# Patient Record
Sex: FEMALE | Race: BLACK OR AFRICAN AMERICAN | Employment: PART TIME | URBAN - METROPOLITAN AREA
[De-identification: names, ages, dates, MRNs, and addresses within clinical notes are randomized per-mention and may not be internally consistent; named-entity substitution may affect disease eponyms.]

---
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
## 2020-07-18 ENCOUNTER — OFFICE VISIT (OUTPATIENT)
Dept: FAMILY MEDICINE CLINIC | Age: 20
End: 2020-07-18
Payer: COMMERCIAL

## 2020-07-18 VITALS
SYSTOLIC BLOOD PRESSURE: 118 MMHG | RESPIRATION RATE: 15 BRPM | HEART RATE: 84 BPM | BODY MASS INDEX: 18.94 KG/M2 | TEMPERATURE: 98 F | DIASTOLIC BLOOD PRESSURE: 84 MMHG | HEIGHT: 68 IN | OXYGEN SATURATION: 98 % | WEIGHT: 125 LBS

## 2020-07-18 PROBLEM — Z34.90 PREGNANT: Status: ACTIVE | Noted: 2020-07-18

## 2020-07-18 PROCEDURE — G8427 DOCREV CUR MEDS BY ELIG CLIN: HCPCS | Performed by: NURSE PRACTITIONER

## 2020-07-18 PROCEDURE — G8420 CALC BMI NORM PARAMETERS: HCPCS | Performed by: NURSE PRACTITIONER

## 2020-07-18 PROCEDURE — 1036F TOBACCO NON-USER: CPT | Performed by: NURSE PRACTITIONER

## 2020-07-18 PROCEDURE — 99212 OFFICE O/P EST SF 10 MIN: CPT | Performed by: NURSE PRACTITIONER

## 2020-07-18 RX ORDER — VITAMIN A ACETATE, .BETA.-CAROTENE, ASCORBIC ACID, CHOLECALCIFEROL, .ALPHA.-TOCOPHEROL ACETATE, DL-, THIAMINE MONONITRATE, RIBOFLAVIN, NIACINAMIDE, PYRIDOXINE HYDROCHLORIDE, FOLIC ACID, CYANOCOBALAMIN, CALCIUM CARBONATE, FERROUS FUMARATE, ZINC OXIDE, AND CUPRIC OXIDE 2000; 2000; 120; 400; 22; 1.84; 3; 20; 10; 1; 12; 200; 27; 25; 2 [IU]/1; [IU]/1; MG/1; [IU]/1; MG/1; MG/1; MG/1; MG/1; MG/1; MG/1; UG/1; MG/1; MG/1; MG/1; MG/1
TABLET ORAL
COMMUNITY

## 2020-07-18 NOTE — LETTER
[address] [address]  Phone: [phone]  Fax: [phone]      July 18, 2020       To Whom it May Concern:    Anthony Haskins was evaluated in our [facility] today, 7/18/2020. Ms. Beatrice Card  should remain out of work until herCOVID-19 test results are back. If you have any questions on concerns, please do not hesitate to call.           Sincerely,          Electronically signed by TANA Jones CNP on 7/18/2020 at 2:37 PM

## 2020-07-18 NOTE — PROGRESS NOTES
[address] [address] [address]  Phone: [phone]    SUBJECTIVE:     Ramona Barba is a 23 y.o. female who presents on 7/18/2020 with:    HPI:  Chief Complaint   Patient presents with    Covid Testing     needs covid testing. pt states she has been around someone who tested postive for covid, denies having any symptoms. Exposure to [address] was self-referred w/ complaint of: close exposure to COVID-19. Pt was just advised her sister's test for covid-19 on 7/13 came back positive. Sister was exposed by her paternal grandmother. Pt reports being in close proximity to her sister, indoors, on multiple occasions within the last 7 days. Sister's sx's included myalgias x 1 day w/o fever or cough and are now resolved. Pt reports she is in her usual state of health. Reports nasal congestion w/ occasional  throat clearing is at baseline    Pt denies: fever/ chills, cough, dyspnea, wheezing, chest tightness, HA, sore throat, N/V/D. Denies anosmia/ dysgeusia. Pt is currently pregnant-- will be 19 weeks on 7/20. Lives w/ her boyfriend. Relevant PMH: no pertinent PMH. History reviewed. No pertinent past medical history. No family history on file. Smoking history: pt reports that she has never smoked. She has never used smokeless tobacco.     Travel screen completed: yes- no recent travel. Pt states was recently hired at [employer] supposed to be meeting w/ HR next week to discuss start date. States she wants to be tested given exposure by her sister prior to starting the new job. Review of Systems   Constitutional: Negative for chills and fever. HENT: Positive for congestion (baseline). Negative for ear pain, rhinorrhea and sore throat. Respiratory: Negative for cough, chest tightness, shortness of breath and wheezing. Cardiovascular: Negative for chest pain and palpitations.    Gastrointestinal: Negative for abdominal pain, diarrhea, nausea and vomiting. Genitourinary: Negative for dysuria and vaginal bleeding. Musculoskeletal: Negative for myalgias. Skin: Negative for rash. Neurological: Negative for weakness, light-headedness and headaches. OBJECTIVE:     Vitals:    07/18/20 1403   BP: 118/84   Pulse: 84   Resp: 15   Temp: 98 °F (36.7 °C)   TempSrc: Oral   SpO2: 98%   Weight: 125 lb (56.7 kg)   Height: 5' 8\" (1.727 m)      Physical Exam  Vitals signs reviewed. Constitutional:       Appearance: She is well-groomed. She is not toxic-appearing. HENT:      Head: Normocephalic and atraumatic. Right Ear: External ear normal.      Left Ear: External ear normal.      Nose: Nose normal.      Mouth/Throat:      Mouth: Mucous membranes are moist.   Eyes:      Extraocular Movements: Extraocular movements intact. Conjunctiva/sclera: Conjunctivae normal.      Pupils: Pupils are equal, round, and reactive to light. Neck:      Musculoskeletal: Normal range of motion and neck supple. Trachea: Trachea and phonation normal.   Cardiovascular:      Rate and Rhythm: Normal rate and regular rhythm. Pulses: Normal pulses. Heart sounds: S1 normal and S2 normal.   Pulmonary:      Effort: Pulmonary effort is normal. No tachypnea. Breath sounds: Normal breath sounds and air entry. Musculoskeletal: Normal range of motion. Skin:     General: Skin is warm and dry. Capillary Refill: Capillary refill takes less than 2 seconds. Findings: No rash. Neurological:      General: No focal deficit present. Mental Status: She is alert and oriented to person, place, and time. Mental status is at baseline. Motor: Motor function is intact. Gait: Gait is intact. Psychiatric:         Mood and Affect: Mood and affect normal.         Speech: Speech normal.         Behavior: Behavior normal. Behavior is cooperative. Thought Content:  Thought content normal.     TESTS:  [x] COVID-19 specimen was collected via oropharyngeal route by CLEMENTINA Hewitt MA and sent to lab. ASSESSMENT & PLAN:     23 y.o. female presenting for COVID testing after close exposure. COVID-19 test was submitted. Pt was instructed about strict self-isolation at home pending results w/ self-monitoring for signs/ sx's of illness and prompt follow-up should any develop. Diagnoses and all orders for this visit:      ICD-10-CM    1. Close exposure to COVID-19 virus  Z20.828 COVID-19 Ambulatory       Pt was sent home w/ written instructions for:  Possible COVID-19 infection w/ self-quarantine at home and management of symptoms  Avoid close contact w/ household members where possible  Do not leave home except to get medical care while waiting for results  Frequent hand washing, cover coughs/ sneezes, disinfect high-touch surfaces daily  Maintain adequate rest and hydration  Vigilant self-monitoring for any signs/ sx's of illness/ fever and prompt follow-up w/ PCP should any develop    Pt is in agreement w/ this care plan. All questions answered.       Kat Ugarte, APRN - CNP  7/18/20

## 2020-07-18 NOTE — PATIENT INSTRUCTIONS
We'll call with COVID-19 results    Things to Know:   - Do not leave home except to get medical care while waiting for your test results  - Testing does not change treatment--> there is no medication that treats COVID-19  - Get plenty of rest and stay hydrated   - Over the counter pain/ fever reducers such as ibuprofen (aka Motrin/ Advil) or acetaminophen (aka Tylenol) per dosage instructions as needed **please do not take these medications if you have a bleeding disorder, stomach or GI ulcer problems, or liver disease**   - Avoid exposure to environmental irritants --> cigarette smoke/pollutants/seasonal allergens where possible  - Practice social distancing (maintaining a distance of 6 feet from other persons) when leaving home is necessary.  - Temperature checks twice daily     Please be vigilant in self-monitoring for signs or symptoms of illness or fever. Symptoms may develop 2 days to 2 weeks following exposure to the virus. If you are exposed after testing, or if you were in the incubation period when tested, you could become ill with COVID-19 later. Keep a low threshold to go to ER or call 9-1-1- for new or suddenly worsening symptoms --> change in nature of cough, high fevers, trouble breathing, persistent vomiting, any other emergency warning signs, or symptoms that you think could be an emergency. Advance Care Planning  People with COVID-19 may have no symptoms, mild symptoms, such as fever, cough, and shortness of breath or they may have more severe illness, developing severe and fatal pneumonia. As a result, Advance Care Planning with attention to naming a health care decision maker (someone you trust to make healthcare decisions for you if you could not speak for yourself) and sharing other health care preferences is important BEFORE a possible health crisis. Please contact your Primary Care Provider to discuss Advance Care Planning.     Prevention steps for People with confirmed or doctor  If you have a medical appointment, call the healthcare provider and tell them that you have or may have COVID-19. This will help the healthcare providers office take steps to keep other people from getting infected or exposed. Wear a facemask  You should wear a facemask when you are around other people (e.g., sharing a room or vehicle) or pets and before you enter a healthcare providers office. If you are not able to wear a facemask (for example, because it causes trouble breathing), then people who live with you should not stay in the same room with you, or they should wear a facemask if they enter your room. Cover your coughs and sneezes  Cover your mouth and nose with a tissue when you cough or sneeze. Throw used tissues in a lined trash can. Immediately wash your hands with soap and water for at least 20 seconds or, if soap and water are not available, clean your hands with an alcohol-based hand  that contains at least 60% alcohol. Clean your hands often  Wash your hands often with soap and water for at least 20 seconds, especially after blowing your nose, coughing, or sneezing; going to the bathroom; and before eating or preparing food. If soap and water are not readily available, use an alcohol-based hand  with at least 60% alcohol, covering all surfaces of your hands and rubbing them together until they feel dry. Soap and water are the best option if hands are visibly dirty. Avoid touching your eyes, nose, and mouth with unwashed hands. Avoid sharing personal household items  You should not share dishes, drinking glasses, cups, eating utensils, towels, or bedding with other people or pets in your home. After using these items, they should be washed thoroughly with soap and water. Clean all high-touch surfaces everyday  High touch surfaces include counters, tabletops, doorknobs, bathroom fixtures, toilets, phones, keyboards, tablets, and bedside tables.  Also, clean any surfaces that may have blood, stool, or body fluids on them. Use a household cleaning spray or wipe, according to the label instructions. Labels contain instructions for safe and effective use of the cleaning product including precautions you should take when applying the product, such as wearing gloves and making sure you have good ventilation during use of the product. Monitor your symptoms  Seek prompt medical attention if your illness is worsening (e.g., difficulty breathing). Before seeking care, call your healthcare provider and tell them that you have, or are being evaluated for, COVID-19. Put on a facemask before you enter the facility. These steps will help the healthcare providers office to keep other people in the office or waiting room from getting infected or exposed. Ask your healthcare provider to call the local or state health department. Persons who are placed under active monitoring or facilitated self-monitoring should follow instructions provided by their local health department or occupational health professionals, as appropriate. When working with your local health department check their available hours. If you have a medical emergency and need to call 911, notify the dispatch personnel that you have, or are being evaluated for COVID-19. If possible, put on a facemask before emergency medical services arrive. Discontinuing home isolation  Patients with confirmed COVID-19 should remain under home isolation precautions until the risk of secondary transmission to others is thought to be low. The decision to discontinue home isolation precautions should be made on a case-by-case basis, in consultation with healthcare providers and state and local health departments.

## 2020-07-19 DIAGNOSIS — Z20.822 CLOSE EXPOSURE TO COVID-19 VIRUS: ICD-10-CM

## 2020-07-24 LAB
SARS-COV-2: NOT DETECTED
SOURCE: NORMAL

## 2020-07-30 ASSESSMENT — ENCOUNTER SYMPTOMS
DIARRHEA: 0
VOMITING: 0
SHORTNESS OF BREATH: 0
WHEEZING: 0
RHINORRHEA: 0
ABDOMINAL PAIN: 0
COUGH: 0
CHEST TIGHTNESS: 0
NAUSEA: 0
SORE THROAT: 0

## 2021-05-29 ENCOUNTER — HOSPITAL ENCOUNTER (EMERGENCY)
Age: 21
Discharge: HOME | End: 2021-05-29
Attending: EMERGENCY MEDICINE
Payer: COMMERCIAL

## 2021-05-29 ENCOUNTER — APPOINTMENT (OUTPATIENT)
Dept: GENERAL RADIOLOGY | Age: 21
End: 2021-05-29
Payer: COMMERCIAL

## 2021-05-29 VITALS
SYSTOLIC BLOOD PRESSURE: 109 MMHG | BODY MASS INDEX: 19.62 KG/M2 | TEMPERATURE: 97.6 F | RESPIRATION RATE: 18 BRPM | HEIGHT: 67 IN | WEIGHT: 125 LBS | DIASTOLIC BLOOD PRESSURE: 99 MMHG | OXYGEN SATURATION: 97 % | HEART RATE: 63 BPM

## 2021-05-29 DIAGNOSIS — K21.9 GASTROESOPHAGEAL REFLUX DISEASE, UNSPECIFIED WHETHER ESOPHAGITIS PRESENT: Primary | ICD-10-CM

## 2021-05-29 PROCEDURE — 93005 ELECTROCARDIOGRAM TRACING: CPT

## 2021-05-29 PROCEDURE — 71046 X-RAY EXAM CHEST 2 VIEWS: CPT

## 2021-05-29 PROCEDURE — 99283 EMERGENCY DEPT VISIT LOW MDM: CPT

## 2021-05-29 ASSESSMENT — ENCOUNTER SYMPTOMS
EYE PAIN: 0
ABDOMINAL PAIN: 1
COLOR CHANGE: 0
NAUSEA: 0
SINUS PAIN: 0
COUGH: 0
SHORTNESS OF BREATH: 1
SORE THROAT: 0
EYE REDNESS: 0
BACK PAIN: 0
VOMITING: 0
DIARRHEA: 0

## 2021-05-29 ASSESSMENT — PAIN SCALES - GENERAL: PAINLEVEL_OUTOF10: 7

## 2021-05-29 ASSESSMENT — PAIN DESCRIPTION - LOCATION: LOCATION: CHEST

## 2021-05-29 ASSESSMENT — PAIN DESCRIPTION - PAIN TYPE: TYPE: ACUTE PAIN

## 2021-05-29 NOTE — ED PROVIDER NOTES
[address] [facility] ED  EMERGENCY DEPARTMENT ENCOUNTER      Pt Name: Anastasia Crawford  MRN: [id number]  [place] 2000  Date of evaluation: 5/29/2021  Provider: Gearline Osgood, DO    CHIEF COMPLAINT       Chief Complaint   Patient presents with    Chest Pain     for several days, shows as epigastric pain, worse with lying down     Chief complaint: Epigastric and chest pain  History of chief complaint: This 66-year-old female presents to the emergency department complaining of awakening early hours of this morning with epigastric burning pain that radiated up into her mid low chest area, patient states reports some associated shortness of breath, the burning pain takes her breath. Patient denies any change in diet. Patient does report multiple previous episodes several months ago states her family doctor did refer her to the gastroenterologist who she saw and was diagnosed with \"indigestion\" patient states she was given a 1 week course of pills at that point and did seem to resolve but started recurring this past week. Patient denies previous EGD. patient states it always seems to occur while she is lying down and will get better after she gets up. Patient denies any sore throat cough cold congestion no palpitations no fevers or chills. Patient denies any regular alcohol use. No associated nausea vomiting or diarrhea. No dark or bloody stool. No difficulty urinating. Patient is postpartum 6 months breast-feeding. Patient denies pregnancy states she has an IUD. She denies any leg pain or swelling, no history of DVT or PE. Nursing Notes were reviewed. REVIEW OF SYSTEMS    (2-9 systems for level 4, 10 or more for level 5)     Review of Systems   Constitutional: Negative for appetite change, chills and fever. HENT: Negative for congestion, sinus pain and sore throat. Eyes: Negative for pain and redness. Respiratory: Positive for shortness of breath. Negative for cough.     Cardiovascular: Exercise per Session:    Stress:     Feeling of Stress :    Social Connections:     Frequency of Communication with Friends and Family:     Frequency of Social Gatherings with Friends and Family:     Attends Taoist Services:     Active Member of Clubs or Organizations:     Attends Club or Organization Meetings:     Marital Status:    Intimate Partner Violence:     Fear of Current or Ex-Partner:     Emotionally Abused:     Physically Abused:     Sexually Abused:          PHYSICAL EXAM    (up to 7 for level 4, 8 or more for level 5)     ED Triage Vitals [05/29/21 0639]   BP Temp Temp Source Pulse Resp SpO2 Height Weight   (!) 109/99 97.6 °F (36.4 °C) Oral 63 18 97 % 5' 7\" (1.702 m) 125 lb (56.7 kg)       Physical Exam   General appearance: Patient is awake alert interactive appropriate nontoxic in no acute distress  Head is atraumatic normocephalic  Eyes pupils are equal and reactive sclera white conjunctive are pink  Oral pharyngeal cavity is pink with good moisture, no exudates or ulcerations no asymmetry, the airway is widely patent  Neck: Supple no meningeal signs no adenopathy no JVD  Heart: Regular rate and rhythm no gross murmurs rubs or clicks  Lungs: Breath sounds are clear with good air movement throughout no active wheezes rales or rhonchi no respiratory distress  Chest wall: Nontender to palpation chest rise is full and symmetric  Abdomen: Soft nontender to palpation, there are good bowel sounds no rebound rigidity or guarding, no Gustafson sign or McBurney's point tenderness, no firm or pulsatile masses, no gross distention, femoral pulses full and symmetric  Back: Nontender to palpation no costovertebral angle tenderness  Skin: Warm and dry without rashes  Lower extremities: No edema or calf tenderness or asymmetry.     DIAGNOSTIC RESULTS     EKG: All EKG's are interpreted by the Emergency Department Physician who either signs or Co-signs this chart in the absence of a cardiologist.    EKG interpreted by ED physician indication epigastric pain: Normal sinus rhythm at 61 with incomplete right bundle branch block, no significant ST-T change no acute infarction pattern    RADIOLOGY:   Non-plain film images such as CT, Ultrasound and MRI are read by the radiologist. Plain radiographic images are visualized and preliminarily interpreted by the emergency physician with the below findings:    Chest x-ray 2 views interpreted by ED physician indication epigastric/chest pain: Heart and mediastinum are within normal limits the lung fields are clear there are no acute consolidations vascular congestion or pneumothorax appreciated. Official radiology report is pending    Interpretation per the Radiologist below, if available at the time of this note:    XR CHEST (2 VW)    (Results Pending)         EMERGENCY DEPARTMENT COURSE and DIFFERENTIAL DIAGNOSIS/MDM:   Vitals:    Vitals:    05/29/21 0639   BP: (!) 109/99   Pulse: 63   Resp: 18   Temp: 97.6 °F (36.4 °C)   TempSrc: Oral   SpO2: 97%   Weight: 125 lb (56.7 kg)   Height: 5' 7\" (1.702 m)       Treatment and course: Patient reports a burning resolved on my examination abdomen is benign. I did discuss with patient EKG and x-ray findings with no significant pathology, suspect gastroesophageal reflux. I did discuss with patient Maalox or Tums generally thought safe with breast-feeding as needed. I did discuss further evaluation with the GI if symptoms continue for possible EGD. FINAL IMPRESSION      1. Gastroesophageal reflux disease, unspecified whether esophagitis present          DISPOSITION/PLAN   DISPOSITION Decision To Discharge 05/29/2021 07:55:20 AM  Patient discharged home advised Tums or Maalox as needed. Monitor closely and return if any change or worsening increasing/recurrent pain or difficulty breathing, any fever, dark or bloody stools, weak or dizzy feeling.   Patient to follow-up with primary physician for repeat assessment in the next 2 to 3 days    PATIENT REFERRED TO:  [facility]  [address] [phone]  In 2 days        DISCHARGE MEDICATIONS:  New Prescriptions    No medications on file     Controlled Substances Monitoring:     No flowsheet data found.     (Please note that portions of this note were completed with a voice recognition program.  Efforts were made to edit the dictations but occasionally words are mis-transcribed.)    Steven Amaya DO (electronically signed)  Attending Emergency Physician            Steven Amaya DO  05/29/21 [id number]

## 2021-05-31 LAB
EKG ATRIAL RATE: 61 BPM
EKG P AXIS: 68 DEGREES
EKG P-R INTERVAL: 158 MS
EKG Q-T INTERVAL: 412 MS
EKG QRS DURATION: 104 MS
EKG QTC CALCULATION (BAZETT): 414 MS
EKG R AXIS: 76 DEGREES
EKG T AXIS: 67 DEGREES
EKG VENTRICULAR RATE: 61 BPM

## 2021-05-31 PROCEDURE — 93010 ELECTROCARDIOGRAM REPORT: CPT | Performed by: INTERNAL MEDICINE

## 2024-03-07 ENCOUNTER — OFFICE VISIT (OUTPATIENT)
Dept: OBSTETRICS AND GYNECOLOGY | Facility: CLINIC | Age: 24
End: 2024-03-07
Payer: COMMERCIAL

## 2024-03-07 ENCOUNTER — APPOINTMENT (OUTPATIENT)
Dept: OBSTETRICS AND GYNECOLOGY | Facility: CLINIC | Age: 24
End: 2024-03-07
Payer: COMMERCIAL

## 2024-03-07 VITALS
BODY MASS INDEX: 21.45 KG/M2 | DIASTOLIC BLOOD PRESSURE: 60 MMHG | SYSTOLIC BLOOD PRESSURE: 90 MMHG | HEIGHT: 66 IN | WEIGHT: 133.5 LBS

## 2024-03-07 DIAGNOSIS — Z01.419 ENCNTR FOR GYN EXAM (GENERAL) (ROUTINE) W/O ABN FINDINGS: Primary | ICD-10-CM

## 2024-03-07 DIAGNOSIS — Z12.4 SCREENING FOR CERVICAL CANCER: ICD-10-CM

## 2024-03-07 PROCEDURE — 99395 PREV VISIT EST AGE 18-39: CPT | Performed by: ADVANCED PRACTICE MIDWIFE

## 2024-03-07 PROCEDURE — 87800 DETECT AGNT MULT DNA DIREC: CPT

## 2024-03-07 PROCEDURE — 88175 CYTOPATH C/V AUTO FLUID REDO: CPT

## 2024-03-07 PROCEDURE — 87661 TRICHOMONAS VAGINALIS AMPLIF: CPT

## 2024-03-07 ASSESSMENT — ENCOUNTER SYMPTOMS
OCCASIONAL FEELINGS OF UNSTEADINESS: 0
LOSS OF SENSATION IN FEET: 0
DEPRESSION: 0

## 2024-03-07 ASSESSMENT — PATIENT HEALTH QUESTIONNAIRE - PHQ9
SUM OF ALL RESPONSES TO PHQ9 QUESTIONS 1 AND 2: 0
2. FEELING DOWN, DEPRESSED OR HOPELESS: NOT AT ALL
1. LITTLE INTEREST OR PLEASURE IN DOING THINGS: NOT AT ALL

## 2024-03-07 NOTE — PROGRESS NOTES
"GYNECOLOGY PROGRESS NOTE      CC:  see below. Patient thinks her last pap was about 3 years ago. Patient agrees to have STI sent off the pap. Patient denies any discharge or odor.  No breast issues. Has Mirena in place and she does have a light menses at times.  Chief Complaint   Patient presents with    Annual Exam     Est pt for annual  Pap states 3 years wnl   No concerns  Mirena Iud in place       HPI:  Ni Monae is here for a routine GYN examination.  No GYN c/o, no AUB.      Contraception:  IUD  Pregnancy plans:  none  Gardasil:  no    Depression screen:  negative      ROS:  GI - no blood in BMs  URO - no hematuria  GYN - no AUB or vaginal discharge  PSYCH - mood OK        PHYSICAL EXAM:  BP 90/60 (BP Location: Left arm, Patient Position: Sitting)   Ht 1.676 m (5' 6\")   Wt 60.6 kg (133 lb 8 oz)   BMI 21.55 kg/m²   GEN:  A&O, NAD  ABD:  NT/ND, soft, no palpable masses  URO:  normal urethra, no bladder TTP  GYN:  normal vulva and perineum w/o lesions or ulcers, normal vagina without discharge or lesions, normal cervix without lesions or discharge or CMT, uterus NT/NE, adnexa mobile and NT/NE  BREAST:  no masses or TTP, no skin lesions or nipple discharge  DERM:  no hirsutism or acne   PSYCH:  normal affect, non-anxious      IMPRESSION/PLAN:    A: normal gyn exam. IUD strings noted  Plan: 1. Pap today with STI sent. 2. Follow up 1 year or prn.   Problem List Items Addressed This Visit    None  Visit Diagnoses       Screening for cervical cancer                   Rosalba Loza, ALICIA-ERNESTINAM      "

## 2024-03-09 LAB
C TRACH RRNA SPEC QL NAA+PROBE: NEGATIVE
N GONORRHOEA DNA SPEC QL PROBE+SIG AMP: NEGATIVE
T VAGINALIS RRNA SPEC QL NAA+PROBE: NEGATIVE

## 2024-03-19 LAB
CYTOLOGY CMNT CVX/VAG CYTO-IMP: NORMAL
LAB AP HPV HR: NORMAL
LAB AP PAP ADDITIONAL TESTS: NORMAL
LABORATORY COMMENT REPORT: NORMAL
PATH REPORT.TOTAL CANCER: NORMAL

## 2025-03-07 ENCOUNTER — APPOINTMENT (OUTPATIENT)
Dept: PRIMARY CARE | Facility: CLINIC | Age: 25
End: 2025-03-07
Payer: COMMERCIAL

## 2025-04-03 ENCOUNTER — APPOINTMENT (OUTPATIENT)
Dept: OBSTETRICS AND GYNECOLOGY | Facility: CLINIC | Age: 25
End: 2025-04-03

## 2025-04-03 VITALS
HEIGHT: 66 IN | SYSTOLIC BLOOD PRESSURE: 106 MMHG | BODY MASS INDEX: 23.46 KG/M2 | DIASTOLIC BLOOD PRESSURE: 68 MMHG | WEIGHT: 146 LBS

## 2025-04-03 DIAGNOSIS — Z01.419 ENCNTR FOR GYN EXAM (GENERAL) (ROUTINE) W/O ABN FINDINGS: Primary | ICD-10-CM

## 2025-04-03 ASSESSMENT — PATIENT HEALTH QUESTIONNAIRE - PHQ9
1. LITTLE INTEREST OR PLEASURE IN DOING THINGS: NOT AT ALL
2. FEELING DOWN, DEPRESSED OR HOPELESS: NOT AT ALL
SUM OF ALL RESPONSES TO PHQ9 QUESTIONS 1 & 2: 0

## 2025-04-03 NOTE — PROGRESS NOTES
"GYNECOLOGY PROGRESS NOTE        CC:  Patient here for her annual exam and IUD removal. Patient has not issues with her IUD but she just wants a break from \"hormones\" she will call if she wants another IUD at some point or another birth control method. Denies concerns about infections. Declines STI testing. No breast issues.   Chief Complaint   Patient presents with    Annual Exam       HPI:  Ni Monae is here for a routine GYN examination.  No GYN c/o, no AUB.        Contraception:  IUD but will be having it out today  Pregnancy plans:  none  Gardasil:  no            ROS:  GI - no blood in BMs  URO - no hematuria  GYN - no AUB or vaginal discharge  PSYCH - mood OK        PHYSICAL EXAM:  /68 (BP Location: Left arm, Patient Position: Sitting, BP Cuff Size: Large adult)   Ht 1.676 m (5' 6\")   Wt 66.2 kg (146 lb)   BMI 23.57 kg/m²   GEN:  A&O, NAD  URO:  normal urethra, no bladder TTP  GYN:  normal vulva and perineum w/o lesions or ulcers, normal vagina without discharge or lesions, normal cervix without lesions or discharge or CMT,IUD strings noted  DERM:  no hirsutism or acne   PSYCH:  normal affect, non-anxious      IMPRESSION/PLAN:  A: normal gyn exam. IUD strings noted. Declines STI testing  Plan; 1. Pap due 2026. 2. IUD to be removed today. 3. Will call if she wants another birth control method in the future.   Problem List Items Addressed This Visit    None      Pap normal 3/2024, no Hx of HGSIL, next due in 3 years.  ASCCP pap smear screening guidelines reviewed with the patient.  Annual STD screening done per CDC guidelines if applicable (if < age 25).      RAUDEL Peterson    IUD REMOVAL PROCEDURE NOTE    Patient's pre-procedure questions were answered and a written informed consent form was signed.   A speculum was placed in the vagina.    IUD strings were identified and were grasped, then with gentle traction the IUD was removed without difficulty.  After removal the IUD was inspected " and it was completely intact.  Speculum was removed.  Patient tolerated the procedure well, there are no complications.  EBL minimal.      Rosalba Loza, ALICIA-ERNESTINAM

## 2025-04-10 ENCOUNTER — HOSPITAL ENCOUNTER (EMERGENCY)
Age: 25
Discharge: HOME | End: 2025-04-11
Attending: EMERGENCY MEDICINE
Payer: OTHER MISCELLANEOUS

## 2025-04-10 VITALS
OXYGEN SATURATION: 100 % | WEIGHT: 146 LBS | RESPIRATION RATE: 16 BRPM | TEMPERATURE: 97.7 F | HEIGHT: 67 IN | SYSTOLIC BLOOD PRESSURE: 124 MMHG | BODY MASS INDEX: 22.91 KG/M2 | HEART RATE: 94 BPM | DIASTOLIC BLOOD PRESSURE: 81 MMHG
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
## 2025-04-10 DIAGNOSIS — S16.1XXA ACUTE STRAIN OF NECK MUSCLE, INITIAL ENCOUNTER: ICD-10-CM

## 2025-04-10 PROCEDURE — 99283 EMERGENCY DEPT VISIT LOW MDM: CPT

## 2025-04-10 PROCEDURE — 6370000000 HC RX 637 (ALT 250 FOR IP): Performed by: EMERGENCY MEDICINE

## 2025-04-10 RX ORDER — ACETAMINOPHEN 500 MG
1000 TABLET ORAL
Status: COMPLETED | OUTPATIENT
Start: 2025-04-10 | End: 2025-04-10

## 2025-04-10 RX ORDER — CYCLOBENZAPRINE HCL 10 MG
10 TABLET ORAL 3 TIMES DAILY PRN
Qty: 30 TABLET | Refills: 0 | Status: SHIPPED | OUTPATIENT
Start: 2025-04-10 | End: 2025-04-20

## 2025-04-10 RX ORDER — KETOROLAC TROMETHAMINE 10 MG/1
10 TABLET, FILM COATED ORAL EVERY 6 HOURS PRN
Qty: 20 TABLET | Refills: 0 | Status: SHIPPED | OUTPATIENT
Start: 2025-04-10 | End: 2025-04-15

## 2025-04-10 RX ADMIN — ACETAMINOPHEN 1000 MG: 500 TABLET ORAL at 23:40

## 2025-04-10 ASSESSMENT — PAIN DESCRIPTION - LOCATION: LOCATION: WRIST

## 2025-04-10 ASSESSMENT — PAIN DESCRIPTION - ORIENTATION: ORIENTATION: RIGHT

## 2025-04-10 ASSESSMENT — LIFESTYLE VARIABLES
HOW MANY STANDARD DRINKS CONTAINING ALCOHOL DO YOU HAVE ON A TYPICAL DAY: PATIENT DOES NOT DRINK
HOW OFTEN DO YOU HAVE A DRINK CONTAINING ALCOHOL: NEVER

## 2025-04-10 ASSESSMENT — PAIN - FUNCTIONAL ASSESSMENT: PAIN_FUNCTIONAL_ASSESSMENT: 0-10

## 2025-04-10 ASSESSMENT — PAIN SCALES - GENERAL: PAINLEVEL_OUTOF10: 8

## 2025-04-11 ENCOUNTER — APPOINTMENT (OUTPATIENT)
Dept: GENERAL RADIOLOGY | Age: 25
End: 2025-04-11
Payer: OTHER MISCELLANEOUS

## 2025-04-11 PROCEDURE — 73110 X-RAY EXAM OF WRIST: CPT

## 2025-04-11 PROCEDURE — 72050 X-RAY EXAM NECK SPINE 4/5VWS: CPT

## 2025-04-11 ASSESSMENT — ENCOUNTER SYMPTOMS
CONSTIPATION: 0
COUGH: 0
NAUSEA: 0
PHOTOPHOBIA: 0
DIARRHEA: 0
SHORTNESS OF BREATH: 0
EYE PAIN: 0
APNEA: 0
BACK PAIN: 0
RHINORRHEA: 0
WHEEZING: 0
VOMITING: 0
ABDOMINAL PAIN: 0
COLOR CHANGE: 0
SORE THROAT: 0
SINUS PRESSURE: 0
ABDOMINAL DISTENTION: 0
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
## 2025-04-15 ENCOUNTER — HOSPITAL ENCOUNTER (OUTPATIENT)
Dept: GENERAL RADIOLOGY | Age: 25
Discharge: HOME | End: 2025-04-17
Payer: OTHER MISCELLANEOUS

## 2025-04-15 ENCOUNTER — HOSPITAL ENCOUNTER (OUTPATIENT)
Age: 25
Discharge: HOME | End: 2025-04-17
Payer: OTHER MISCELLANEOUS

## 2025-04-15 ENCOUNTER — OFFICE VISIT (OUTPATIENT)
Dept: ORTHOPEDIC SURGERY | Age: 25
End: 2025-04-15

## 2025-04-15 VITALS — WEIGHT: 146 LBS | HEIGHT: 67 IN | BODY MASS INDEX: 22.91 KG/M2

## 2025-04-15 DIAGNOSIS — S69.81XA TFC (TRIANGULAR FIBROCARTILAGE COMPLEX) INJURY, RIGHT, INITIAL ENCOUNTER: ICD-10-CM
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
## 2025-04-15 DIAGNOSIS — S69.91XA INJURY OF RIGHT WRIST, INITIAL ENCOUNTER: ICD-10-CM
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
## 2025-04-15 PROCEDURE — 73110 X-RAY EXAM OF WRIST: CPT

## 2025-04-15 RX ORDER — DICLOFENAC SODIUM 75 MG/1
75 TABLET, DELAYED RELEASE ORAL 2 TIMES DAILY PRN
Qty: 60 TABLET | Refills: 1 | Status: SHIPPED | OUTPATIENT
Start: 2025-04-15

## 2025-04-15 ASSESSMENT — ENCOUNTER SYMPTOMS: BACK PAIN: 0

## 2025-04-15 NOTE — PROGRESS NOTES
Topics    Alcohol use: No       PMH, Surgical Hx, Family Hx, and Social Hx reviewed and updated.  Health Maintenance reviewed.    Reviewed with the patient: current clinical status, medications, labs, images, activities and diet.     Side effects, adverse effects of the medication prescribed today, as well as treatment plan/ rationale and result expectations have been discussed with the patient who expresses understanding and desires to proceed.  Adverse effects of medications prescribed or otc medications were discussed during visit. Medication interactions were discussed during visit. Red flag concerns were discussed during visit. Patient understood and agreed to medications, will monitor for side effects.  Close follow up to evaluate treatment results and for coordination of care.    I have reviewed the patient's medical history in detail and updated the computerized patient record.    Objective     Vitals:    04/15/25 1144   Weight: 66.2 kg (146 lb)   Height: 1.702 m (5' 7\")        Physical Exam  Musculoskeletal:      Right wrist: Tenderness and bony tenderness present. No swelling or deformity. Decreased range of motion. Normal pulse.      Comments: Tenderness to palpation of the distal radius, TFC complex, generalized dorsal forearm region.  Pain worsens with resistance to flexion extension of the right wrist.  Capillary refill normal, radial pulse normal.  Pinch strength normal,  strength 4/5 secondary to pain with gripping.           Please note, this report has been partially produced using speech recognition software and may cause  and /or contain errors related to that system including grammar, punctuation and spelling as well as words and phrases that may seem inappropriate. If there are questions or concerns please feel free to contact me to clarify.    Salvatore Rojo MD

## 2025-04-16 ENCOUNTER — RESULTS FOLLOW-UP (OUTPATIENT)
Age: 25
End: 2025-04-16

## 2025-04-30 ENCOUNTER — HOSPITAL ENCOUNTER (OUTPATIENT)
Dept: ORTHOPEDIC SURGERY | Age: 25
Discharge: HOME | End: 2025-05-02

## 2025-04-30 ENCOUNTER — OFFICE VISIT (OUTPATIENT)
Age: 25
End: 2025-04-30

## 2025-04-30 VITALS
BODY MASS INDEX: 22.91 KG/M2 | TEMPERATURE: 97.7 F | OXYGEN SATURATION: 99 % | HEART RATE: 68 BPM | WEIGHT: 146 LBS | HEIGHT: 67 IN

## 2025-04-30 DIAGNOSIS — S69.91XA INJURY OF RIGHT WRIST, INITIAL ENCOUNTER: ICD-10-CM

## 2025-04-30 DIAGNOSIS — S69.81XA TFC (TRIANGULAR FIBROCARTILAGE COMPLEX) INJURY, RIGHT, INITIAL ENCOUNTER: ICD-10-CM

## 2025-04-30 DIAGNOSIS — S52.551D OTHER CLOSED EXTRA-ARTICULAR FRACTURE OF DISTAL END OF RIGHT RADIUS WITH ROUTINE HEALING, SUBSEQUENT ENCOUNTER: Primary | ICD-10-CM
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
## 2025-04-30 PROCEDURE — 99212 OFFICE O/P EST SF 10 MIN: CPT | Performed by: FAMILY MEDICINE

## 2025-04-30 PROCEDURE — 73110 X-RAY EXAM OF WRIST: CPT

## 2025-04-30 PROCEDURE — 99213 OFFICE O/P EST LOW 20 MIN: CPT | Performed by: FAMILY MEDICINE

## 2025-04-30 RX ORDER — TRAMADOL HYDROCHLORIDE 50 MG/1
50 TABLET ORAL EVERY 6 HOURS PRN
Qty: 20 TABLET | Refills: 0 | Status: SHIPPED | OUTPATIENT
Start: 2025-04-30 | End: 2025-05-05

## 2025-04-30 ASSESSMENT — ENCOUNTER SYMPTOMS: BACK PAIN: 0

## 2025-04-30 NOTE — PROGRESS NOTES
treatment results and for coordination of care.    I have reviewed the patient's medical history in detail and updated the computerized patient record.    Objective     Vitals:    04/30/25 1354   Pulse: 68   Temp: 97.7 °F (36.5 °C)   TempSrc: Temporal   SpO2: 99%   Weight: 66.2 kg (146 lb)   Height: 1.702 m (5' 7\")        Physical Exam  Musculoskeletal:      Right wrist: Tenderness and bony tenderness present. No swelling, deformity or effusion. Decreased range of motion. Normal pulse.      Comments: Capillary refill and radial pulse are normal           Please note, this report has been partially produced using speech recognition software and may cause  and /or contain errors related to that system including grammar, punctuation and spelling as well as words and phrases that may seem inappropriate. If there are questions or concerns please feel free to contact me to clarify.    Salvatore Rojo MD

## 2025-05-28 ENCOUNTER — HOSPITAL ENCOUNTER (OUTPATIENT)
Dept: ORTHOPEDIC SURGERY | Age: 25
Discharge: HOME | End: 2025-05-30
Payer: COMMERCIAL

## 2025-05-28 ENCOUNTER — OFFICE VISIT (OUTPATIENT)
Age: 25
End: 2025-05-28
Payer: COMMERCIAL

## 2025-05-28 VITALS — WEIGHT: 146 LBS | BODY MASS INDEX: 22.91 KG/M2 | HEART RATE: 62 BPM | OXYGEN SATURATION: 95 % | HEIGHT: 67 IN

## 2025-05-28 DIAGNOSIS — S52.551D OTHER CLOSED EXTRA-ARTICULAR FRACTURE OF DISTAL END OF RIGHT RADIUS WITH ROUTINE HEALING, SUBSEQUENT ENCOUNTER: Primary | ICD-10-CM

## 2025-05-28 DIAGNOSIS — S52.551D OTHER CLOSED EXTRA-ARTICULAR FRACTURE OF DISTAL END OF RIGHT RADIUS WITH ROUTINE HEALING, SUBSEQUENT ENCOUNTER: ICD-10-CM

## 2025-05-28 PROCEDURE — G8420 CALC BMI NORM PARAMETERS: HCPCS | Performed by: FAMILY MEDICINE

## 2025-05-28 PROCEDURE — G8427 DOCREV CUR MEDS BY ELIG CLIN: HCPCS | Performed by: FAMILY MEDICINE

## 2025-05-28 PROCEDURE — 99212 OFFICE O/P EST SF 10 MIN: CPT | Performed by: FAMILY MEDICINE

## 2025-05-28 PROCEDURE — 1036F TOBACCO NON-USER: CPT | Performed by: FAMILY MEDICINE

## 2025-05-28 PROCEDURE — 73110 X-RAY EXAM OF WRIST: CPT

## 2025-05-28 PROCEDURE — 99213 OFFICE O/P EST LOW 20 MIN: CPT | Performed by: FAMILY MEDICINE

## 2025-05-28 ASSESSMENT — ENCOUNTER SYMPTOMS: BACK PAIN: 0

## 2025-05-28 NOTE — PROGRESS NOTES
History:   Procedure Laterality Date    MOUTH SURGERY       No family history on file.  Social History     Tobacco Use    Smoking status: Never    Smokeless tobacco: Never   Substance Use Topics    Alcohol use: No       PMH, Surgical Hx, Family Hx, and Social Hx reviewed and updated.  Health Maintenance reviewed.    Reviewed with the patient: current clinical status, medications, labs, images, activities and diet.     Side effects, adverse effects of the medication prescribed today, as well as treatment plan/ rationale and result expectations have been discussed with the patient who expresses understanding and desires to proceed.  Adverse effects of medications prescribed or otc medications were discussed during visit. Medication interactions were discussed during visit. Red flag concerns were discussed during visit. Patient understood and agreed to medications, will monitor for side effects.  Close follow up to evaluate treatment results and for coordination of care.    I have reviewed the patient's medical history in detail and updated the computerized patient record.    Objective     Vitals:    05/28/25 1351   Pulse: 62   SpO2: 95%   Weight: 66.2 kg (146 lb)   Height: 1.702 m (5' 7\")        Physical Exam  Musculoskeletal:      Right wrist: No swelling, effusion, tenderness or bony tenderness. Decreased range of motion. Normal pulse.      Comments: Mild reduction in extension secondary to immobilization           Please note, this report has been partially produced using speech recognition software and may cause  and /or contain errors related to that system including grammar, punctuation and spelling as well as words and phrases that may seem inappropriate. If there are questions or concerns please feel free to contact me to clarify.    Salvatore Rojo MD